# Patient Record
Sex: MALE | Race: BLACK OR AFRICAN AMERICAN | NOT HISPANIC OR LATINO | Employment: FULL TIME | URBAN - METROPOLITAN AREA
[De-identification: names, ages, dates, MRNs, and addresses within clinical notes are randomized per-mention and may not be internally consistent; named-entity substitution may affect disease eponyms.]

---

## 2022-05-16 ENCOUNTER — OFFICE VISIT (OUTPATIENT)
Dept: URGENT CARE | Facility: CLINIC | Age: 42
End: 2022-05-16

## 2022-05-16 VITALS
TEMPERATURE: 97 F | HEART RATE: 72 BPM | WEIGHT: 167 LBS | SYSTOLIC BLOOD PRESSURE: 118 MMHG | HEIGHT: 71 IN | DIASTOLIC BLOOD PRESSURE: 79 MMHG | OXYGEN SATURATION: 98 % | BODY MASS INDEX: 23.38 KG/M2 | RESPIRATION RATE: 18 BRPM

## 2022-05-16 NOTE — PROGRESS NOTES
"Subjective:       Patient ID: Eliezer Groves is a 42 y.o. male.    Vitals:  height is 5' 11" (1.803 m) and weight is 75.8 kg (167 lb). His temperature is 96.6 °F (35.9 °C). His blood pressure is 118/79 and his pulse is 72. His respiration is 18 and oxygen saturation is 98%.     Chief Complaint: STD CHECK    PT REQUESTING STD CHECK, NO SYMPTOMS    Exposure to STD  The patient's pertinent negatives include no pelvic pain, penile discharge or penile pain. This is a new problem. The current episode started today. The problem occurs rarely. The problem has been unchanged. The patient is experiencing no pain. Pertinent negatives include no constipation, coughing, flank pain, frequency or painful intercourse. Nothing aggravates the symptoms. He has tried nothing for the symptoms. The treatment provided no relief.       Respiratory: Negative for cough.    Gastrointestinal: Negative for constipation.   Genitourinary: Negative for frequency, flank pain, penile discharge, penile pain and pelvic pain.       Objective:      Physical Exam      Assessment:       No diagnosis found.      Plan:         There are no diagnoses linked to this encounter.               "